# Patient Record
(demographics unavailable — no encounter records)

---

## 2017-05-17 NOTE — EC
PATIENT:BELEN GARDINER                     DATE OF SERVICE: 05/17/17
SEX: F                                  MEDICAL RECORD: [id number]
DATE OF BIRTH: 08/01/52                        LOCATION:[place] [address]
AGE OF PATIENT: 64                             ADMISSION DATE: 05/17/17
 
REFERRING PHYSICIAN:                               
 
INTERPRETING PHYSICIAN: ALTHEA NOLAND M.D.          
 
 
 
                             ECHOCARDIOGRAM REPORT
  ECHO CHARGES 4               ECHO COMPLETE            
 
 
 
CLINICAL DIAGNOSIS: ACUTE ANTERIOR MI             
 
                         ECHOCARDIOGRAPHIC MEASUREMENTS
      (adult normal given)
        AC root (d.<3.7cm) 2.7    LV Septum d (<1.2 cm> 1.4 
           Valve Excursion 1.4      LV Septum (systole) 1.6 
     Left Atria (s.<4.0cm> 3.1           LVPW d(<1.2cm) 1.4 
             RV (d.<2.3cm) 2.8            LVPW (sytole) 1.5 
       LV diastole(<5.6CM) 4.6        MV E-F(>70mm/sec)     
                LV systole 3.5            LVOT Diameter 1.7 
            MV exc.(>10mm) 1.3 
Est.ejection fraction (50-75%)     Pericardial Effusion N
 
   DOPPLER:
     LVIT       A 93.0 E 52.0
       LA      RVSP 17  
     LVOT 96   AOP1/2T     
  Asc. Ao 126 
     RVOT 85  
       RA     
        
 AV Gradient Peak 6.33  AV Mean 3.15  AV Area 1.5 
 MV Gradient Peak 4.37  MV Mean 1.33  MV Area     
   COMMENTS:                                              
 
 
 Cardiac Sonographer: Chrissy JOHN              
      Cardiologist:2          Dr. Noland                
             TAPE# PACS           
                                                                                     
 
 
DATE OF SERVICE:  05/18/2017
 
INDICATION:  Status post anterior wall myocardial infarction.
 
DESCRIPTION:  Left ventricle demonstrates left ventricular hypertrophy.  The
septum and apex are moderately hypokinetic.  Estimated ejection fraction is in
the order of 40%.  Mitral valve is structurally normal.  There is no
regurgitation or prolapse seen.  Left atrium is normal size.  The aortic valve
is trileaflet.  I do not see stenosis or regurgitation.  Right ventricle is
mildly dilated.  Tricuspid valve is structurally normal.  There is trivial
 
 
 
ECHOCARDIOGRAM REPORT                          [id number]    BELEN GARDINER             
 
 
regurgitation seen.  Right atrium is normal size.  There is no pericardial
effusion noted.
 
IMPRESSION:
1.  Left ventricular hypertrophy with moderate left ventricular dysfunction. 
Ejection fraction of 40%.  The apex and septum are hypokinetic consistent with
recent myocardial infarction.
2.  Trivial tricuspid regurgitation.
 
TRANSINT:[id number] Voice Confirmation ID: [id number] DOCUMENT ID: [id number]
                                           
                                           ALTHEA NOLAND M.D.          
 
 
 
 
 
 
 
 
 
 
 
 
 
 
 
 
 
 
 
 
 
 
 
 
 
 
 
 
 
 
 
 
 
 
CC:                                                             [id number]
DICTATION DATE: 05/18/17 1159     :     05/18/17 1206      ADM IN  
                                                                              
[facility]                                          
[address] [id number]

## 2017-05-17 NOTE — HEMODYNAMI
PATIENT:BELEN GARDINER                                      MEDICAL RECORD: [id number]
: 52                                            LOCATION:[facility]# [id number]                                       ADMISSION DATE: 17
 
 
 Generatedon:201717:38
Patient name: BELEN GARDINER Patient #: [id number] Visit #: [id number] SSN: [id number]
[id number] :
1952 Date of study: 2017
Page: Of
Hemodynamic Procedure Report
****************************
Patient Data
Patient Demographics
Procedure consent was obtained
First Name: BELEN           Gender: Female
Last Name: ABDIFATAH            : 1952
Patient #: [id number]       Age: 64 year(s)
Visit #: [id number]       Race: Unknown
SSN: 
Accession #:
[id number]
Additional ID: [id number]
Contact details
Address: [address] [place] [address]    Phone: [phone]
State: [place]
City: [place]
Zip code: [id number]
Admission
Admission Data
Admission Date: 2017   Admission Time: 16:23
Arrival Date: 2017     Arrival Time: 16:23
Admit Source: Other
Weight (lbs.): 164
Weight (kg.): 74.39
Lab Results
Lab Result Date: 2017  Lab Result Time: 0:00
Biochemistry
Name         Units    Result                Min      Max
BUN          mg/dl    18       --(---*)--   7        18
Creatinine   mg/dl    0.9      --(-*--)--   0.6      1.3
CBC
Name         Units    Result                Min      Max
Hemoglobin   g/dl     14.6     --(-*--)--   13.5     17.5
Procedure
Procedure Types
Cath Procedure
Diagnostic Procedure
LHC
LHC w/Coronaries
PCI Procedure
AMI-BMS/JORDON Initial
Miscellaneous Procedures
Moderate Sedation up to 30 minutes
Procedure Description
Procedure Date
Procedure Date: 2017
Procedure Start Time: 17:05
Procedure End Time: 17:31
 
Procedure Staff
Name                            Function
Ankur Raymundo MD                   Performing Physician
Jennifer Bustos RT                    Scrub
Edvin Miramontes RN                  Nurse
Trenton Dominguez RT                  Monitor
Indication
Angina
Procedure Data
Cath Procedure
Fluoroscopy
Diagnostic fluoroscopy      Total fluoroscopy Time: 6.4
time: 6.4 min               min
Diagnostic fluoroscopy      Total fluoroscopy dose:
dose: 1235 mGy              1235 mGy
Contrast Material
Contrast Material Type                       Amount (ml)
Isovue 370                                   208
Entry Location
Entry     Primary  Successful  Side  Size  Upsize Upsize Entry    Closure Succes
sful  Closure
Location                             (Fr)  1 (Fr) 2 (Fr) Remarks  Device        
      Remarks
Femoral                        Right 6 Fr                         Exoseal
artery                               Short
Estimated blood loss: 5 ml
Diagnostic catheters
Device Type               Used For           End Catheter
Placement
Cordis 5Fr JL 4.0         Left Coronary
Catheter (MP)             Angiography
Cordis 5Fr 3DRC Catheter  Right Coronary
(MP)                      Angiography
Cordis 5Fr Pigtail        LV Angiography
Catheter (MP)
Procedure Complications
No complications
Procedure Medications
Medication           Administration Route Dosage
Oxygen               NC                   2 l/min
Lidocaine 2%         added to field       20
Heparin Flush Bag    added to field       2 bags
(1000units/500ml NS)
0.9% NaCl            I.V.                 100 ml/hr
Solumedrol           I.V.                 125 mg
Versed               I.V.                 1 mg
Fentanyl             I.V.                 50 mcg
Heparin Bolus        I.V.                 5000 units
Nitroglycerin IC/IA  I.C.                 100 mcg
Versed               I.V.                 1 mg
Fentanyl             I.V.                 50 mcg
Nitroglycerin IC/IA  I.C.                 100 mcg
Integrilin (Bolus    I.V.                 6.8 ml
2mg/ml)
Brilinta             P.O.                 180 mg
Hemodynamics
Rest
HGB: 14.6 (g/dl) Heart Rate: 73 (bpm)
Pressure Samples
Time     Site     Value (mmHg) Purpose      Heart      Use
 
Rate(bpm)
17:27    LV       137/12,30    Snapshot     78
17:27    AO       142/78(107)  Pullback     76
17:27    LV       138/14,41    Pullback     76
Gradients
Valve  Time  Site 1    Site 2      Mean    SEP/DFP    Peak To Heart  Use
(mmHg)  (sec/min)  Peak    Rate
(mmHg)  (bpm)
Aortic 17:27 LV        AO          0       6          0       76
138/14,41 142/78(107)
Calculations
Valve    P-P      Mean      Valve     Index  Valve    Source
Name              Gradient  Area             Flow
(cm2)
Aortic   0        0
0        0
Snapshots
Pre Cath      Intra         NCS           Post Cath
Vital Signs
Time     Heart  Resp   SPO2 etCO2   TO3qehe NIBP (mmHg) Rhythm    Pain    Sedati
on
Rate   (ipm)  (%)  (mmHg)  (mmHg)                        Status  Level
(bpm)
17:00:01 78     16     88   0       0       145/89(113) NSR w/ ST 0 (11)  10(A)
Elevation , No
pain
17:04:13 73     16     98   0       0       162/92(131) NSR w/ ST 0 (11)  10(A)
Elevation , No
pain
17:08:31 76     17     97   0       0       163/94(138) NSR w/ ST 0 (11)  10(A)
Elevation , No
pain
17:12:54 76     16     96   0       0       162/83(136) NSR w/ ST 0 (11)  9(A)
Elevation , No
pain
17:17:08 80     20     96   0       0       145/82(112) NSR w/ ST 0 (11)  9(A)
Elevation , No
pain
17:21:24 75     17     96   0       0       142/82(117) NSR w/ ST 0 (11)  9(A)
Elevation , No
pain
17:25:38 80     18     94   0       0       121/70(90)  NSR w/ ST 0 (11)  9(A)
Elevation , No
pain
17:29:46 81     16     94   0       0       131/80(108) NSR       0 (11)  10(A)
, No
pain
Medications
Time     Medication       Route  Dose  Verified Delivered Reason          Notes 
   Effectiveness
by       by
16:55:49 Solumedrol       I.V.   125   Ankur     Buffie    Per physician
mg    Ludin Miramontes RN
16:58:29 Oxygen           NC     2     Ankur     Buffie    used for
l/min Ludin Miramontes RN   procedure
16:58:36 Lidocaine 2%     added  20ml  Ankur     Ankur      for local
to     vial  Ludin Raymundo MD  anesthetic
field
16:58:42 Heparin Flush    added  2     Ankur     Ankur      used for
Bag              to     bags  Ludin Raymundo MD  procedure
 
(1000units/500ml field
NS)
16:58:50 0.9% NaCl        I.V.   100   Ankur     Buffie    Per physician
ml/hr Ludin Miramontes RN
17:07:15 Versed           I.V.   1 mg  Ankur     Buffie    for sedation
Ludin Miramontes RN
17:07:20 Fentanyl         I.V.   50    Ankur     Buffie    for sedation
mcg   Ludin Miramontes RN
17:11:25 Heparin Bolus    I.V.   5000  Ankur     Buffie    for             verifi
ed
units Ludin Miramontes RN   anticoagulation with dr raymundo
17:17:01 Nitroglycerin    I.C.   100   Ankur     Ankur      for
IC/IA                   mcg   Ludin Raymundo MD  vasodilation
17:19:31 Versed           I.V.   1 mg  Ankur     Simonie    for sedation
Ludin Miramontes RN
17:19:35 Fentanyl         I.V.   50    Ankur     Buffie    for sedation
mcg   Ludin Miramontes RN
17:24:23 Nitroglycerin    I.C.   100   Ankur     Ankur      for
IC/IA                   mcg   Ludin Raymundo MD  vasodilation
17:29:04 Integrilin       I.V.   6.8   Ankur     Simonie    for             wasted
(Bolus 2mg/ml)          ml    Ludin Miramontes RN   antiplatelet    3.2 ml
therapy         of vial
17:32:34 Brilinta         P.O.   180   Ankur     Edvin    for
mg    Ludin Miramontes RN   antiplatelet
therapy
Procedure Log
Time     Note
16:40:21 Edvin Miramontes RN sent for patient. Start room use.
16:51:50 Informed consent obtained and on chart
16:51:54 Diagnostic Cath Status : Elective
16:52:15 Indication : Angina
16:52:28 Time tracking: Regular hours
16:52:32 Plan of Care:Hemodynamics will remain stable., Cardiac
rhythm will remain stable., Comfort level will be
maintained., Respiratory function will remain
adequate., Patient/ family verbilizes understanding of
procedure., Procedure tolerated without complication.,
Recovers from procedure without complications..
16:52:39 Patient received from ED to CCL 1 Alert and oriented.
Tansferred to table in Supine position.
16:52:40 Warm blankets applied, and yamileth hugger turned on for
patient comfort.
16:52:41 Correct patient and procedure confirmed by team.
16:52:41 ECG and BP/O2 sat monitors applied to patient.
16:55:49 Solumedrol 125 mg I.V. was administered by Edvin Miramontes
RN; Per physician;
16:58:29 Oxygen 2 l/min NC was administered by Edvin Miramontes RN;
used for procedure;
16:58:36 Lidocaine 2% 20ml vial added to field was administered
by Ankur Raymundo MD; for local anesthetic;
16:58:42 Heparin Flush Bag (1000units/500ml NS) 2 bags added to
field was administered by Ankur Raymundo MD; used for
procedure;
16:58:50 0.9% NaCl 100 ml/hr I.V. was administered by Edvin Miramontes RN; Per physician;
16:58:54 Vital chart was started
16:59:06 Baseline sample Acquired.
16:59:11 Rhythm: sinus rhythm , w/ ST elevation
16:59:12 Full Disclosure recording started
 
16:59:16 H&P Date Dictated: 2017 New H&P dictated by
physician..
16:59:17 Pre-procedure instructions explained to patient.
16:59:18 Pre-op teaching completed and patient verbalized
understanding.
16:59:19 Family in waiting room.
16:59:47 Patient NPO since Midnight.
17:00:53 Is the patient allergic to Iodine/contrast media? Yes.
17:00:54 Was the patient premedicated? Yes
17:01:11 Is patient on blood thinner?Yes
17:01:18 **ACC** The patient was administered the following
blood thiners within the last 24 hours: **ACC**Heparin
17:01:21 Patient diabetic? Yes.
17:01:22 If diabetic: On Metformin? No
17:01:26 Previous problem with sedation/anesthesia? No ?
17:01:27 Snore? Yes
17:01:31 Sleep apnea? No
17:01:32 Deviated septum? No
17:01:33 Opens mouth fully? Yes
17:01:34 Sticks out tongue? Yes
17:01:36 Airway obstruction? No ?
17:01:39 Dentures? No ?
17:01:46 Pre procedure: right dorsailis pedis pulse 1+
Palpable, but thready & weak; easily obliterated
17:01:50 Patient pain scale 10/10 ?.
17:02:35 IV patent on arrival in right antecubital with 0.9%
NaCl at O.
17:02:46 Lab results completed and on chart.
17:02:50 Right groin area was prepped with chlora-prep and
draped in sterile fashion
17:02:51 Alarms reviewed by R. N.
17:02:52 Sharps counted by scrub and verified by R.N.
17:02:53 Physician arrived
17:02:53 --------ALL STOP TIME OUT------
17:02:54 Final Timeout: patient, procedure, and site verified
with staff and physician. All members of the team are
in agreement.
17:02:57 Right groin site verified by team.
17:03:00 Physical assessment completed. ASA score P 2 - A
patient with mild systemic disease as per Ankur Raymundo
MD.
17:03:05 Sedation plan: IV Moderate Sedation Versed, Fentanyl
17:05:12 Procedure started.
17:05:14 Use device set Femoral Dx
17:05:15 Acist Syringe opened to sterile field.
17:05:16 Bag Decanter opened to sterile field.
17:05:16 Medline Cath Pack opened to sterile field.
17:05:17 St Laci 260cm J .035 wire opened to sterile field.
17:05:18 Acist Hand Control opened to sterile field.
17:05:18 Acist Manifold opened to sterile field.
17:05:19 Diagnostic Infinity 5Fr Multipack catheter opened to
sterile field.
17:05:19 Tegaderm 4 x 4 opened to sterile field.
17:05:35 Local anesthetic to right femoral artery with
Lidocaine 2% by Ankur Raymundo MD.**INITIAL ACCESS ONLY**
17:05:43 A 6 Fr Short sheath was inserted into the Right
Femoral artery
17:06:21 Abbott BMW Universal 2 J-tip 300cm 0.014 guide wir
opened to sterile field.
17:06:21 Merit BasixCompak Inflation Kit opened to sterile
 
field.
17:06:22 High Pressure Extension Tubing (Ludin) opened to
sterile field.
17:06:56 A Cordis 5Fr JL 4.0 Catheter (MP) was advanced over
the wire and used for Left Coronary Angiography.
17:07:15 Versed 1 mg I.V. was administered by Edvin Miramontes RN;
for sedation;
17:07:19 LCA angiography performed.
17:07:20 Fentanyl 50 mcg I.V. was administered by Edvin Miramontes
RN; for sedation;
17:07:22 Injector settings: Ml/sec: 3, Volume: 6,
17:07:51 Cordis 6FR XBLAD 3.5 guide catheter opened to sterile
field.
17:07:57 Catheter removed.
17:08:02 A Cordis 5Fr 3DRC Catheter (MP) was advanced over the
wire and used for Right Coronary Angiography.
17:09:18 RCA angiography performed.
17:09:21 Injector settings: Ml/sec: 3, Volume: 6,
17:10:05 Patient Weight : 164 kg
17:10:05 Admit Source: Other
17:10:07 Arrival Date: 2017 4:23:00 PM
17:10:26 Catheter removed.
17:10:27 Proceeding to intervention.
17:10:38 6 Fr xblad 3.5 guide catheter was inserted over the
wire
17:11:25 Heparin Bolus 5000 units I.V. was administered by
Edvin Miramontes RN; for anticoagulation; verified with dr raymundo
17:12:15 bmw wire advanced.
17:13:46 Inflation number: 1 A Anchor Point Sci Vermilion 2.0 X 15
balloon was prepped and advanced across the Mid LAD,
then inflated to 12 BETZY for 0:10 (min:sec).
17:14:16 Inflation number: 2 The Anchor Point Sci Vermilion 2.0 X 15
balloon was reinflated across the Mid LAD, to 9 BETZY
for 0:10 (min:sec).
17:15:56 Balloon removed over the wire.
17:17:01 Nitroglycerin IC/ mcg I.C. was administered by
Ankur Raymundo MD; for vasodilation;
17:19:31 Versed 1 mg I.V. was administered by Edvin Miramontes RN;
for sedation;
17:19:35 Fentanyl 50 mcg I.V. was administered by Edvin Miramontes
RN; for sedation;
17:20:25 Inflation Number: 3 A Medtronic Integrity 2.25 X 26
stent was prepped and advanced across the Mid LAD. The
stent was deployed at 12 BETZY for 0:10 (min:sec).
17:21:35 Stent catheter was removed intact over wire.
17:23:44 Inflation Number: 1 A Medtronic Integrity 2.5 X 12
stent was prepped and advanced across the Prox LAD.
The stent was deployed at 10 BETZY for 0:10 (min:sec).
17:24:23 Nitroglycerin IC/ mcg I.C. was administered by
Ankur Raymundo MD; for vasodilation;
17:26:04 Stent catheter was removed intact over wire.
17:26:06 Wire removed.
17:26:06 Guide catheter removed.
17:26:16 A Cordis 5Fr Pigtail Catheter (MP) was advanced over
the wire and used for LV Angiography.
17:27:24 LV hemodynamics recorded.
17:27:26 LV gram done using BAY
17:27:29 Injector settings: Ml/sec: 5, Volume: 15,
17:27:36 EF : 30 %
 
17:27:53 Catheter removed.
17:28:39 Cordis 6Fr Exoseal opened to sterile field.
17:28:51 Sheath removed intact; hemostasis achieved with
Exoseal to the Right Femoral artery.
17:29:04 Integrilin (Bolus 2mg/ml) 6.8 ml I.V. was administered
by Edvin Miramontes RN; for antiplatelet therapy; wasted
3.2 ml of vial
17:29:06 Procedure ended.(Physican Out)
17:29:37 Fluoroscopy time 06.40 minutes.
17::43 Flurop Dose total: 1235
17::43 Fluoroscopy dose: 1235 mGy
17:29:50 Contrast amount:Isovue 370 208ml.
17:29:52 Sharps counted by scrub and verified by R.N.
17:30:07 Insertion/operative site no bleeding no hematoma.
17:30:10 Post-op/insertion site Right Femoral artery dressed
using a 4 x 4 and Tegaderm.
17:30:13 Post right femoral artery:stable
17:30:14 Post Procedure Pulses reassessed and unchanged
17:30:18 Post procedure rhythm: unchanged.
17:30:20 Estimated blood loss: 5 ml
17:30:22 Post procedure instruction explained to
patient.Patient verbalizes understanding.
17:30:22 Patient needs reinforcement of post procedure
teaching.
17:30:39 Procedure type changed to Cath procedure, Diagnostic
procedure, LHC, LHC w/Coronaries, PCI procedure,
AMI-BMS/JORDON Initial, Miscellaneous Procedures,
Moderate Sedation up to 30 minutes
17:31:17 Procedure and supply charges have been captured,
reviewed, submitted and are correct.
17:31:23 Procedure Complication : No complications
17:31:25 Vital chart was stopped
17:31:26 See physician's report for complete and final results.
17:31:29 Report given to CVICU.
17:31:32 Patient transfered to CVICU with Stretcher.
17:31:36 Procedure ended.
17:31:36 Full Disclosure recording stopped
17:32:01 **ACC-PCI Only** Patient was given prescriptions, or
instructed by Ankur Raymundo MD to start/continue the
following medications upon discharge: Brilinta
17:32:10 End room use (Document Last)
17:32:34 Brilinta 180 mg P.O. was administered by Edvin Miramontes
RN; for antiplatelet therapy;
17:37:20 Lab Result : Creatinine 0.9 mg/dl
17:37:20 Lab Result : BUN 18 mg/dl
17:37:20 Lab Result : Hemoglobin 14.6 g/dl
Intervention Summary
Intervention Notes
Time     ActionType  Lesion and  Equipment Action#  Pressure  Duration
Attributes  Used
17:13:46 Inflate     Mid LAD     Anchor Point    1        12        00:10
balloon                 Sci
Vermilion
2.0 X 15
balloon
17:14:16 Reinflate   Mid LAD     Anchor Point    2        9         00:10
balloon                 Sci
Vermilion
2.0 X 15
balloon
 
17:20:25 Place stent Mid LAD     Medtronic 3        12        00:10
Integrity
2.25 X 26
stent
17:23:44 Place stent Prox LAD    Medtronic 1        10        00:10
Integrity
2.5 X 12
stent
Device Usage
Item Name   Manufacture  Quantity  Catalog Number  Hospital Part    Current Mini
mal Lot# /
Charge   Number  Stock   Stock   Serial#
Code
Acist       Acist        1         [id number]           223509   718426  570301  20
Syringe     Medical
Systems Inc
Bag         Microtek     1         2002S           233569   24667   077888  5
[employer]
Medline     Cardinal     1         [id number]       846171   50600   117060  5
Cath Pack   Health
St Laci     St Laci      1         [id number]          676335   894900  429273  30
260cm J
.035 wire
Acist Hand  Acist        1         26574           030426   542561  204620  5
Control     Medical
Systems Inc
Acist       Acist        1         96104           302458   071120  144120  5
Manifold    Medical
Systems Inc
Diagnostic  Cardinal     1         [id number]          824077   06961   197757  30
Infinity    Health
5Fr
Multipack
catheter
Tegaderm 4  3M           1         1626W           698122   899760  732590  5
x 4
Bueno BMW  Abbott       1         [id number]        830047   281715  429902  5
[employer] 2 Vascular
J-tip 300cm
0.014 guide
wir
Merit       Merit        1         [id number]          561558   022488  039494  15
[employer]
Inflation
Kit
High        Merit        1         [id number]         053253   99906   750339  10
Pressure    Medical
Extension
Tubing
(Raymundo)
Cordis 5Fr  Cardinal     1                                          655788  5
JL 4.0      Health
Catheter
(MP)
Cordis 6FR  Cardinal     1         [id number]        345735   074817  439937  10
XBLAD 3.5   Health
guide
catheter
Cordis 5Fr  Cardinal     1                                          079996  5
3DRC        Health
 
Catheter
(MP)
Anchor Point Sci  Anchor Point       1         [id number]  732354   035619  760673  1   
    [id number]
Cross Current    Scientific
2.0 X 15
balloon
Medtronic   Medtronic    1         [id number]       295814           557175  1   
    [id number]
Integrity
2.25 X 26
stent
Medtronic   Medtronic    1         [id number]       278737           875286  3   
    [id number]
Integrity
2.5 X 12
stent
Cordis 5Fr  Cardinal     1                                          519554  5
Pigtail     Health
Catheter
(MP)
Cordis 6Fr  Cardinal     1                    020433   104178  338422  10
[employer]
Signature Audit Randle
Stage           Time        Signature      Unsigned
Intra-Procedure 2017   Jennifer Bustos
5:38:24 PM  RT(R)
Signatures
Monitor : Trenton Dominguez RT Signature :
______________________________
Date : ______________ Time :
______________
 
 
 
 
 
 
 
 
 
 
 
 
 
 
 
 
 
 
 
 
 
 
 
[address] [address]

## 2017-05-17 NOTE — NUR
Received patient awake in bed with  at bedside, Assessment completed
per flowsheet. Patient AO x4, calm and cooperative. Eyes PERRLA @ 3mm with
brisk response, patient wears glasses. S1/S2 noted NSR on telemetry with HR
87, rhythmic and regular. Breathing is even and unlabored on 2L via NC, lung
sounds clear bilateral upper with slightly diminished lower. Abdomen is soft
and flat, non-tender with bowel sounds active x4. Gonzalez secured in place,
clear yellow urine noted in collection bag. Full ROM upper extremities and L
lower, R lower kept straight from Cath procedure. R groin incision with
Femstop/dressing CDI, site is soft to palpation with no bleeding noted. All
pulses palpable with cap refill <3 sec, skin warm/dry to touch. 20g PIV noted
R AC, dressing intact with fluids infusing. Patient denies pain or other needs
at this time, all VSS and will continue to monitor.

## 2017-05-17 NOTE — NUR
Reassessment completed per flowsheet, patient resting in bed with eyes closed.
S1/S2 noted with NSR on telemetry with HR 74, rhythmic and regular. Breathing
is even and unlabored on 2L via NC, O2 sat 94%. R groin incision dressing CDI,
site soft to palpation with no bleeding noted. All pulses palpable with Cap
refill <3 sec. Patient denies pain or other needs at this time, all VSS and
will continue to monitor.

## 2017-05-17 NOTE — NUR
RECEIVED PT TO ROOM CV08 VIA BED. CVICU MONITORS CONNECTED. FEMSTOP ON RIGHT
GROIN, TO BE REMOVED AT 1900 PER CATH LAB STAFF.

## 2017-05-18 NOTE — NUR
SPOUSE AT BEDSIDE, STATUS UPDATED, VOICES NO NEEDS AT THIS TIME, PATIENT AAO,
CALL LIGHT IN REACH, AWAITING TRANSFER OUT OF CVICU, REPEAT POTASSIUM LEVEL
ORDERED AND LAB CALLED FOR DRAW

## 2017-05-18 NOTE — NUR
REC'D REPORT AND RESUMED CARE, AAO, VSS, O2 VIA NC AT 2L, SAT 98%, RIGHT AC
WITH COBAN DRESSING IN PLACE BLOODY DRAINAGE OOZING, DRESSING OFF AND REPLACED
WITH GAUZE DRESSING AND MEDIPORE TAPE, EDUCATED PATIENT ON KEEING ARM BENT FOR
5-10 MINUTES, VERBALIZED UNDERSTANDING, B/L PEDAL PULSES PALPABLE, EXTREMITIES
WARM AND PINK, ASSESSMENT COMPLETE PER FLOWSHEET, C/O OF CHEST PAIN 4/10, TYRON
AND COMES, CALL LIGHT IN REACH, VOICES NO NEEDS AT THIS TIME

## 2017-05-18 NOTE — NUR
REPORT RECIEVED. ASSESSMENT COMPLET EPER FLOW SHEET. VSS. DENIES NEEDS OR
PAIN. GIVEN ICE WATER FOR COMFORT. WILL CONTINUE TO MONITOR.

## 2017-05-18 NOTE — NUR
BREAKFAST TRAY TO BEDSIDE, INDEPENDENT WITH SET UP AND EATING, HX OF DM, FSBS
183, WILL NOTIFY MD FOR S/S

## 2017-05-18 NOTE — OP
PATIENT NAME:  BELEN GARDINER                               MEDICAL RECORD: [id number]
:52                                             LOCATION:[facility]    .[id number]
                                                         ADMISSION DATE:17
SURGEON:  ALTHEA RODRIGUEZ M.D.          
 
 
DATE OF OPERATION:  2017
 
PROCEDURES PERFORMED:
1. Selective coronary angiography.
2. Left heart catheterization with ventriculogram.
3. PTCA and stent placed in LAD.
 
INDICATION:  A 64-year-old presents with acute anterior wall myocardial
infarction.
 
EQUIPMENT USED:  Diagnostic 5-Hong Konger JL4, Wolfgang right, pigtail catheter.
 
INTERVENTION:  A 6-Hong Konger XB LAD guide, BMW guide wire, 2.0 x 15 mm Isabela
balloon, 2.25 x 26 mm Integrity stent, 2.5 x 12 mm Integrity stent.
 
TECHNIQUE:  A 6-[place] Konger sheath was inserted in retrograde fashion in the right
common femoral artery.  Next, selective coronary angiography was performed in
standard 5-Hong Konger JL4 and Wolfgang right.  Left heart catheterization performed
using pigtail catheter.
 
CORONARY ANATOMY:
1. Left main:  Left main trunk is moderate in caliber.  It gives rise to the LAD
and circumflex.  It has no obstruction.
2. LAD:  This vessel is 100% occluded in the mid segment.
3. Circumflex:  This vessel is large in caliber and dominant.  The bifurcation
point, the first lateral branch has a 60% to 70% stenosis.  The distal vessel
has an 80% to 90% stenosis was then angulated area, the vessel is quite small at
this point.
4. Right coronary:  This vessel is nondominant.  It is diffusely diseased.
5. Left ventricle:  Left ventricle is normal in size.  The apex and distal
anterior wall are severely hypokinetic.  Its ejection fraction is in the order
of 30% to 35%.
 
DESCRIPTION OF INTERVENTION:  A 5000 units of heparin was infused.  The patient
received 5000 units in the Emergency Room.  A 6-Hong Konger XB LAD guide was advanced
and engaged in the left main coronary artery.  Next, a BMW wire was used to
cross the occlusion placed in the distal LAD.  The proximal vessel was
predilated with a 2.0 x 15 mm Isabela balloon at 10 atmospheres.  ____
restoration of flow a long diseased area involving the proximal mid LAD.  A 2.25
x 26 mm Integrity stent was placed in the mid LAD and deployed at 10
atmospheres.  However, proximal stent, there is an area of ulceration.  It was
not covered by the stent.  A 2.5 x 12 mm Integrity stent was placed in
overlapping fashion.  This stent was deployed at 10 atmospheres as well. 
Injection revealed both stents to be widely patent with 0% residual stenosis. 
There is marked improvement in distal flow of the LAD.  At this point, the wire
and guidewire were removed.
 
IMPRESSION:  Successful percutaneous transluminal coronary angioplasty and stent
in the LAD with 0% residual stenosis.
 
TRANSINT:[id number] Voice Confirmation ID: [id number] DOCUMENT ID: [id number]
 
 
 
OPERATIVE REPORT                               [id number]    BELEN GARDINER,ALTHEA VILLAREAL M.D.          
 
 
 
Electronically Signed by ALTHEA RODRIGUEZ on 17 at 0618
 
 
 
 
 
 
 
 
 
 
 
 
 
 
 
 
 
 
 
 
 
 
 
 
 
 
 
 
 
 
 
 
 
 
 
 
 
 
 
 
 
CC:                                                             [id number]
DICTATION DATE: 17     :     17 0049      [facility] [place]  
                                                                              
[facility]                                          
[address] [place] [address]

## 2017-05-18 NOTE — NUR
SPOUSE AT BEDSIDE, STATUS UPDATED, VOICES NO NEEDS AT THIS TIME, LUNCH TRAY TO
BEDSIDE, NO NEEDS AT THIS TIME

## 2017-05-18 NOTE — NUR
BATH AND LINEN CHANGE COMPLETED, TOLERATED WITHOUT DIFFICULTY, OOB TO CHAIR,
CALL LIGHT IN REACH, VOICES NO OTHER NEEDS AT THIS TIME

## 2017-05-18 NOTE — NUR
DR MICHAEL ZACARIAS, DR DC T ORDER FOR [place] 10/235 Q4H FOR PAIN. WILL ADM.
DENIES FURTHER NEEDS.

## 2017-05-18 NOTE — NUR
IV site R AC bleeding, D/C and wrapped with Coban. R groin cath site dressing
CDI with no bleeding noted, all pulses palpable. Will continue to monitor.

## 2017-05-18 NOTE — NUR
Reassessment completed per flowsheet, patient resting in bed with eyes closed.
S1/S2 noted NSR on telemetry with HR 78, rhythmic and regular. Breathing is
even and unlabored on 2L via NC, O2 sat 95%. R groin cath site soft to
palpation with no bleeding noted, dressing CDI. All pulses palpable with cap
refill <3 sec. Patient states pain in mid chest 2/10, repositioned for comfort
with patient stating relief. No further needs at this time, all VSS and will
continue to monitor.

## 2017-05-19 NOTE — NUR
Patient Name: BELEN GARDINER Admission Status: ER
Accout number: [id number] Admission Date: 2017
: 1952 Admission Diagnosis:STEMI INVOLVING OTH CORONARY ARTERY OF
ANTERIOR WALL
Attending: REGINA Current LOS: 2
 
Anticipated DC Date: 2017
Planned Disposition: Home
Primary Insurance: [employer]
 
LATE ENTRY:
Discharge Planning Comments:
* Is the patient Alert and Oriented? Yes 0
* How many steps to enter\exit or inside your home? 1 0
* PCP DR. SHAGUFTA LORA 0
* Pharmacy [facility] 0
* Preadmission Environment Home with Family 0
* ADLs Independent 0
* Equipment None 0
* Other Equipment [facility] MEDICAL EQUIPMENT PROVIDER PREFERENCE 0
* List name and contact numbers for known caregivers / representatives who
currently or will assist patient after discharge: NIELS GARDINER, SPOUSE,
[phone] 0
* Community resources currently utilized None 0
* Please name any agencies selected above. NONE 0
* Additional services required to return to the preadmission environment? No 0
* Can the patient safely return to the preadmission environment? Yes 0
* Has this patient been hospitalized within the prior 30 days at any hospital?
No 0
 
CM MET WITH PT AND SPOUSE IN ROOM TO DISCUSS DISCHARGE PLANNING AND NEEDS. PT
REPORTS LIVING AT HOME INDEPENDENTLY WITH SPOUSE. PT HAS NO MEDICAL EQUIPMENT
AND NO OUTSIDE SERVICES ASSISTING IN THE HOME. CM DISCUSSED AVAILABILITY OF
HOME HEALTH, REHAB SERVICES AND MEDICAL EQUIPMENT. PT DENIES DISCHARGE NEEDS,
REPORTS HER SPOUSE IS HERE TO PICK HER UP FOR DISCHARGE HOME TODAY.
 
: Emmanuel Taylor
